# Patient Record
Sex: MALE | URBAN - METROPOLITAN AREA
[De-identification: names, ages, dates, MRNs, and addresses within clinical notes are randomized per-mention and may not be internally consistent; named-entity substitution may affect disease eponyms.]

---

## 2022-07-09 ENCOUNTER — TELEPHONE ENCOUNTER (OUTPATIENT)
Dept: URBAN - METROPOLITAN AREA CLINIC 121 | Facility: CLINIC | Age: 47
End: 2022-07-09

## 2022-07-10 ENCOUNTER — TELEPHONE ENCOUNTER (OUTPATIENT)
Dept: URBAN - METROPOLITAN AREA CLINIC 121 | Facility: CLINIC | Age: 47
End: 2022-07-10

## 2025-01-28 ENCOUNTER — DASHBOARD ENCOUNTERS (OUTPATIENT)
Age: 50
End: 2025-01-28

## 2025-01-29 ENCOUNTER — TELEPHONE ENCOUNTER (OUTPATIENT)
Dept: URBAN - METROPOLITAN AREA CLINIC 63 | Facility: CLINIC | Age: 50
End: 2025-01-29

## 2025-01-29 ENCOUNTER — OFFICE VISIT (OUTPATIENT)
Dept: URBAN - METROPOLITAN AREA CLINIC 63 | Facility: CLINIC | Age: 50
End: 2025-01-29

## 2025-01-29 NOTE — HPI-PREVIOUS PROCEDURES
11/4/2024 endoscopy consistent with normal esophagus Gastric antrum with linear erosions Distal antrum with erosions and pallidus pylorus consistent with chronic PUD Duodenum consistent with chronic ulcer disease with 2 superficial subcentimeter ulcers with extensive erythema consistent with duodenitis Repeat EGD for ulcer healing and gastric nodularity Duodenal biopsy consistent with intact villous architecture Gastric antrum biopsy consistent with chronic antral and fundic type mucosa with moderate chronic active H. pylori gastritis Focal intestinal metaplasia with no dysplasia or malignancy. 11/4/2024 colonoscopy consistent with normal terminal ileum 5 mm sessile polyp in ascending colon 2 polyps largest approximately 1.3 cm and pedunculated removed with hot snare in transverse colon Sigmoid diverticulosis Pathology consistent with tubular adenomas

## 2025-01-29 NOTE — HPI-ZZZTODAY'S VISIT
Patient is a very pleasant 49-year-old male who presents for follow-up of an ulcer.  He is new patient to our practice will be establishing with Dr. Paniagua today.  Past medical history significant for hypertension.  Past surgical history significant for orthopedic procedures.  Last colonoscopy and endoscopy 11/4/2024 inpatient at [place].  Family history noncontributory.  Dysphasia, dyspepsia, pyrosis, abdominal pain, change in bowel habits, unintentional weight loss, melena, hematochezia.  EtOH tobacco or illicit drug use.  Denies NSAID use. Patient presented to [facility] 10/29/2024 with epigastric pain burning and nausea.  He had done similarly 7 months prior was given a GI cocktail and had significant improvement.  He had labs and CT abdomen pelvis with contrast with no acute abnormalities.  He was started on pantoprazole and GI cocktail.  Recommend follow-up outpatient. Patient presented again on 11/1/2024 with nausea abdominal pain and inability to eat.  Again labs were within normal limits, CTA with enlarging hypodense mass in the spleen measuring 7.4 x 6.7 cm and flash fill hemangioma in right hepatic lobe measuring 8 mm.  Right upper quadrant ultrasound within normal limits.  HIDA scan within normal limits with ejection fraction 75%